# Patient Record
Sex: MALE | Race: WHITE | Employment: FULL TIME | URBAN - METROPOLITAN AREA
[De-identification: names, ages, dates, MRNs, and addresses within clinical notes are randomized per-mention and may not be internally consistent; named-entity substitution may affect disease eponyms.]

---

## 2020-01-15 ENCOUNTER — HOSPITAL ENCOUNTER (EMERGENCY)
Facility: CLINIC | Age: 30
Discharge: HOME | End: 2020-01-15
Attending: EMERGENCY MEDICINE | Admitting: EMERGENCY MEDICINE
Payer: COMMERCIAL

## 2020-01-15 VITALS
DIASTOLIC BLOOD PRESSURE: 71 MMHG | OXYGEN SATURATION: 96 % | SYSTOLIC BLOOD PRESSURE: 131 MMHG | TEMPERATURE: 98.4 F | RESPIRATION RATE: 24 BRPM | HEIGHT: 65 IN | HEART RATE: 92 BPM

## 2020-01-15 DIAGNOSIS — J10.1 INFLUENZA A: ICD-10-CM

## 2020-01-15 PROCEDURE — 99282 EMERGENCY DEPT VISIT SF MDM: CPT

## 2020-01-15 ASSESSMENT — ENCOUNTER SYMPTOMS
FEVER: 1
SHORTNESS OF BREATH: 1

## 2020-01-15 NOTE — ED AVS SNAPSHOT
Emergency Department  [address] [place] [id number]  Phone:  [phone]  Fax:  [phone]                                    Bandar Ordoñez   MRN: [id number]    Department:   Emergency Department   Date of Visit:  1/15/2020           After Visit Summary Signature Page    I have received my discharge instructions, and my questions have been answered. I have discussed any challenges I see with this plan with the nurse or doctor.    ..........................................................................................................................................  Patient/Patient Representative Signature      ..........................................................................................................................................  Patient Representative Print Name and Relationship to Patient    ..................................................               ................................................  Date                                   Time    ..........................................................................................................................................  Reviewed by Signature/Title    ...................................................              ..............................................  Date                                               Time          22EPIC Rev 08/18

## 2020-01-15 NOTE — LETTER
January 15, 2020      To Whom It May Concern:      Bandar Ordoñez was seen in our Emergency Department today, 01/15/20.  I expect his condition to improve over the next 3 days.  He may return to work/school when improved.    Sincerely,        Son Carty, DO

## 2020-01-16 NOTE — ED PROVIDER NOTES
History     Chief Complaint:    Shortness of Breath and Influenza    The history is provided by the patient.      Bandar Ordoñez is a 29 year old male who presents with shortness of breath that began 4 days ago and has not worsened or improved since then. The patient was seen at [facility] urgent care earlier today and tested positive for influenza A. His blood work was reassuring and no pneumonia was shown on the chest x-ray; see results below.  He denies a history of asthma and did not receive a flu shot this year. The patient was given 2 neb treatments at urgent care with no relief, so he was sent to the ED. He reports fevers and body aches.     Imaging and Laboratory Results from [facility] 01/15/2020:    XR  Chest 2 Views, PA & LAT:   1. Increased bronchovascular  markings both lungs, rule out bronchitis.  2. Prominent prashant bilateral; rule out lymphadenopathy; suggest obtaining a chest CT for further assessment.  3. 9 mm nodular opacity overlying the right lower lateral lung; probably was present in 2016; this can be further assessed on the chest CT, as per radiology.     ISTAT Chem 8: Potassium 3.1 L, Ionized calcium 1.14 L  CBC:  L o/w WNL (WBC 4.9, HGB 16.5)    Influenza A/B Antigen: Influenza A positive    Allergies:  No Known Drug Allergies     Medications:    The patient is currently on no regular medications.    Past Medical History:    The patient denies any significant past medical history.    Past Surgical History:    The patient does not have any pertinent past surgical history.    Family History:    No past pertinent family history.    Social History:  Positive for tobacco use - smokeless tobacco user, never smoked.  Negative for alcohol use.  Negative for drug use.  Marital Status:  Unknown.     Review of Systems   Constitutional: Positive for fever.   Respiratory: Positive for shortness of breath.    Musculoskeletal:        Reports body aches   All other systems reviewed and are  "negative.      Physical Exam     Patient Vitals for the past 24 hrs:   BP Temp Temp src Pulse Resp SpO2 Height   01/15/20 2019 -- -- -- -- -- 97 % --   01/15/20 1957 -- -- -- -- -- 97 % --   01/15/20 1842 131/71 98.4  F (36.9  C) Oral 92 24 95 % 1.651 m (5' 5\")     Physical Exam    General: Alert and cooperative with exam. Patient in mild distress. Normal mentation.  Head:  Scalp is NC/AT  Eyes:  No scleral icterus, PERRL  ENT:  The external nose and ears are normal. The oropharynx is normal and without erythema; mucus membranes are moist. Uvula midline, no evidence of deep space infection.  Neck:  Normal range of motion without rigidity.  CV:  Regular rate and rhythm    No pathologic murmur   Resp:  Mild increased work of breathing.  Lungs clear to auscultation  GI:  Abdomen is soft, no distension, no tenderness. No peritoneal signs  MS:  No lower extremity edema   Skin:  Warm and dry, No rash or lesions noted.  Neuro: Oriented x 3. No gross motor deficits.    Emergency Department Course     Emergency Department Course:  Past medical records, nursing notes, and vitals reviewed.    1959: I performed an exam of the patient as documented above.     2031: I rechecked the patient and discussed the results of his workup thus far.     2041: Patient rechecked and updated.    I personally reviewed the results with the Patient and answered all related questions prior to discharge.     Findings and plan explained to the Patient. Patient discharged home with instructions regarding supportive care, medications, and reasons to return. The importance of close follow-up was reviewed.     Impression & Plan     Medical Decision Making:  Bandar Ordoñez is a 29 year old male who presents for evaluation of shortness of breath, fever and myalgias. This is consistent with influenza; confirmed on outside lab testing.   The patient is out of treatment window for influenza and medications ordered as noted above.  They are at risk for " pneumonia but no signs of this are detected on today's visit; chest x-ray obtained earlier today; see results above (findings discussed with patient).  Close followup of primary care physician is indicated and return to the ED for high fevers, increasing productive cough, shortness of breath, or confusion.  There is no signs of serious bacterial infection such as bacteremia, meningitis, UTI/pyelonephritis, strep pharyngitis, etc. patient was noted to be mildly hypokalemic on labs earlier today; discussed dietary replacement.  At time of discharge patient was hemodynamically stable with normal oxygen saturations and showing no significant evidence of respiratory distress.    Diagnosis:    ICD-10-CM    1. Influenza A J10.1      Disposition:  Discharged to home.    Scribe Disclosure:  IAmanda, am serving as a scribe at 7:59 PM on 1/15/2020 to document services personally performed by Son Carty DO based on my observations and the provider's statements to me.     Amanda Olivera  1/15/2020    EMERGENCY DEPARTMENT       Son Carty DO  01/16/20 0056

## 2021-04-28 ENCOUNTER — APPOINTMENT (OUTPATIENT)
Dept: CT IMAGING | Facility: CLINIC | Age: 31
End: 2021-04-28
Attending: EMERGENCY MEDICINE
Payer: COMMERCIAL

## 2021-04-28 ENCOUNTER — HOSPITAL ENCOUNTER (EMERGENCY)
Facility: CLINIC | Age: 31
Discharge: HOME | End: 2021-04-28
Attending: EMERGENCY MEDICINE | Admitting: EMERGENCY MEDICINE
Payer: COMMERCIAL

## 2021-04-28 VITALS
DIASTOLIC BLOOD PRESSURE: 68 MMHG | SYSTOLIC BLOOD PRESSURE: 109 MMHG | TEMPERATURE: 98.5 F | BODY MASS INDEX: 24.16 KG/M2 | OXYGEN SATURATION: 93 % | WEIGHT: 145 LBS | HEART RATE: 98 BPM | HEIGHT: 65 IN | RESPIRATION RATE: 21 BRPM

## 2021-04-28 DIAGNOSIS — F10.920 ALCOHOLIC INTOXICATION WITHOUT COMPLICATION (H): ICD-10-CM

## 2021-04-28 DIAGNOSIS — R10.84 ABDOMINAL PAIN, GENERALIZED: ICD-10-CM

## 2021-04-28 DIAGNOSIS — E87.20 LACTIC ACIDOSIS: ICD-10-CM

## 2021-04-28 DIAGNOSIS — E87.6 HYPOKALEMIA: ICD-10-CM

## 2021-04-28 DIAGNOSIS — R74.01 TRANSAMINITIS: ICD-10-CM

## 2021-04-28 LAB
ALBUMIN SERPL-MCNC: 4.1 G/DL (ref 3.4–5)
ALP SERPL-CCNC: 66 U/L (ref 40–150)
ALT SERPL W P-5'-P-CCNC: 130 U/L (ref 0–70)
ANION GAP SERPL CALCULATED.3IONS-SCNC: 5 MMOL/L (ref 3–14)
AST SERPL W P-5'-P-CCNC: 91 U/L (ref 0–45)
BASOPHILS # BLD AUTO: 0.1 10E9/L (ref 0–0.2)
BASOPHILS NFR BLD AUTO: 1.3 %
BILIRUB SERPL-MCNC: 0.3 MG/DL (ref 0.2–1.3)
BUN SERPL-MCNC: 6 MG/DL (ref 7–30)
CALCIUM SERPL-MCNC: 7.9 MG/DL (ref 8.5–10.1)
CHLORIDE SERPL-SCNC: 107 MMOL/L (ref 94–109)
CO2 SERPL-SCNC: 28 MMOL/L (ref 20–32)
CREAT SERPL-MCNC: 0.9 MG/DL (ref 0.66–1.25)
DIFFERENTIAL METHOD BLD: NORMAL
EOSINOPHIL # BLD AUTO: 0.2 10E9/L (ref 0–0.7)
EOSINOPHIL NFR BLD AUTO: 3.1 %
ERYTHROCYTE [DISTWIDTH] IN BLOOD BY AUTOMATED COUNT: 12.7 % (ref 10–15)
ETHANOL SERPL-MCNC: 0.28 G/DL
GFR SERPL CREATININE-BSD FRML MDRD: >90 ML/MIN/{1.73_M2}
GLUCOSE SERPL-MCNC: 120 MG/DL (ref 70–99)
HCT VFR BLD AUTO: 46 % (ref 40–53)
HGB BLD-MCNC: 15.5 G/DL (ref 13.3–17.7)
IMM GRANULOCYTES # BLD: 0.1 10E9/L (ref 0–0.4)
IMM GRANULOCYTES NFR BLD: 1.3 %
INTERPRETATION ECG - MUSE: NORMAL
LACTATE BLD-SCNC: 1.6 MMOL/L (ref 0.7–2)
LACTATE BLD-SCNC: 2.6 MMOL/L (ref 0.7–2)
LIPASE SERPL-CCNC: 202 U/L (ref 73–393)
LYMPHOCYTES # BLD AUTO: 3.7 10E9/L (ref 0.8–5.3)
LYMPHOCYTES NFR BLD AUTO: 52.8 %
MAGNESIUM SERPL-MCNC: 2.2 MG/DL (ref 1.6–2.3)
MCH RBC QN AUTO: 28.4 PG (ref 26.5–33)
MCHC RBC AUTO-ENTMCNC: 33.7 G/DL (ref 31.5–36.5)
MCV RBC AUTO: 84 FL (ref 78–100)
MONOCYTES # BLD AUTO: 0.5 10E9/L (ref 0–1.3)
MONOCYTES NFR BLD AUTO: 6.4 %
NEUTROPHILS # BLD AUTO: 2.5 10E9/L (ref 1.6–8.3)
NEUTROPHILS NFR BLD AUTO: 35.1 %
NRBC # BLD AUTO: 0 10*3/UL
NRBC BLD AUTO-RTO: 0 /100
PLATELET # BLD AUTO: 226 10E9/L (ref 150–450)
POTASSIUM SERPL-SCNC: 2.9 MMOL/L (ref 3.4–5.3)
PROT SERPL-MCNC: 7.5 G/DL (ref 6.8–8.8)
RBC # BLD AUTO: 5.45 10E12/L (ref 4.4–5.9)
SODIUM SERPL-SCNC: 140 MMOL/L (ref 133–144)
TROPONIN I SERPL-MCNC: <0.015 UG/L (ref 0–0.04)
WBC # BLD AUTO: 7 10E9/L (ref 4–11)

## 2021-04-28 PROCEDURE — 96365 THER/PROPH/DIAG IV INF INIT: CPT

## 2021-04-28 PROCEDURE — 85025 COMPLETE CBC W/AUTO DIFF WBC: CPT | Performed by: EMERGENCY MEDICINE

## 2021-04-28 PROCEDURE — 250N000011 HC RX IP 250 OP 636: Performed by: EMERGENCY MEDICINE

## 2021-04-28 PROCEDURE — 83605 ASSAY OF LACTIC ACID: CPT | Mod: 91 | Performed by: EMERGENCY MEDICINE

## 2021-04-28 PROCEDURE — 74177 CT ABD & PELVIS W/CONTRAST: CPT

## 2021-04-28 PROCEDURE — 250N000009 HC RX 250: Performed by: EMERGENCY MEDICINE

## 2021-04-28 PROCEDURE — 80053 COMPREHEN METABOLIC PANEL: CPT | Performed by: EMERGENCY MEDICINE

## 2021-04-28 PROCEDURE — 83735 ASSAY OF MAGNESIUM: CPT | Performed by: EMERGENCY MEDICINE

## 2021-04-28 PROCEDURE — 84484 ASSAY OF TROPONIN QUANT: CPT | Performed by: EMERGENCY MEDICINE

## 2021-04-28 PROCEDURE — 258N000003 HC RX IP 258 OP 636: Performed by: EMERGENCY MEDICINE

## 2021-04-28 PROCEDURE — 96361 HYDRATE IV INFUSION ADD-ON: CPT

## 2021-04-28 PROCEDURE — 93005 ELECTROCARDIOGRAM TRACING: CPT

## 2021-04-28 PROCEDURE — 96366 THER/PROPH/DIAG IV INF ADDON: CPT

## 2021-04-28 PROCEDURE — 99285 EMERGENCY DEPT VISIT HI MDM: CPT | Mod: 25

## 2021-04-28 PROCEDURE — 83690 ASSAY OF LIPASE: CPT | Performed by: EMERGENCY MEDICINE

## 2021-04-28 PROCEDURE — 250N000013 HC RX MED GY IP 250 OP 250 PS 637: Performed by: EMERGENCY MEDICINE

## 2021-04-28 PROCEDURE — 82077 ASSAY SPEC XCP UR&BREATH IA: CPT | Performed by: EMERGENCY MEDICINE

## 2021-04-28 PROCEDURE — 96375 TX/PRO/DX INJ NEW DRUG ADDON: CPT

## 2021-04-28 RX ORDER — KETOROLAC TROMETHAMINE 15 MG/ML
15 INJECTION, SOLUTION INTRAMUSCULAR; INTRAVENOUS ONCE
Status: COMPLETED | OUTPATIENT
Start: 2021-04-28 | End: 2021-04-28

## 2021-04-28 RX ORDER — POTASSIUM CHLORIDE 1.5 G/1.58G
40 POWDER, FOR SOLUTION ORAL ONCE
Status: COMPLETED | OUTPATIENT
Start: 2021-04-28 | End: 2021-04-28

## 2021-04-28 RX ORDER — ONDANSETRON 2 MG/ML
4 INJECTION INTRAMUSCULAR; INTRAVENOUS ONCE
Status: COMPLETED | OUTPATIENT
Start: 2021-04-28 | End: 2021-04-28

## 2021-04-28 RX ORDER — IOPAMIDOL 755 MG/ML
73 INJECTION, SOLUTION INTRAVASCULAR ONCE
Status: COMPLETED | OUTPATIENT
Start: 2021-04-28 | End: 2021-04-28

## 2021-04-28 RX ORDER — POTASSIUM CHLORIDE 7.45 MG/ML
10 INJECTION INTRAVENOUS ONCE
Status: COMPLETED | OUTPATIENT
Start: 2021-04-28 | End: 2021-04-28

## 2021-04-28 RX ADMIN — IOPAMIDOL 73 ML: 755 INJECTION, SOLUTION INTRAVENOUS at 04:27

## 2021-04-28 RX ADMIN — SODIUM CHLORIDE 61 ML: 9 INJECTION, SOLUTION INTRAVENOUS at 04:27

## 2021-04-28 RX ADMIN — KETOROLAC TROMETHAMINE 15 MG: 15 INJECTION, SOLUTION INTRAMUSCULAR; INTRAVENOUS at 04:45

## 2021-04-28 RX ADMIN — POTASSIUM CHLORIDE 10 MEQ: 7.46 INJECTION, SOLUTION INTRAVENOUS at 05:56

## 2021-04-28 RX ADMIN — POTASSIUM CHLORIDE 40 MEQ: 1.5 POWDER, FOR SOLUTION ORAL at 05:55

## 2021-04-28 RX ADMIN — ONDANSETRON 4 MG: 2 INJECTION INTRAMUSCULAR; INTRAVENOUS at 04:45

## 2021-04-28 RX ADMIN — SODIUM CHLORIDE 1000 ML: 9 INJECTION, SOLUTION INTRAVENOUS at 04:09

## 2021-04-28 RX ADMIN — SODIUM CHLORIDE 1000 ML: 9 INJECTION, SOLUTION INTRAVENOUS at 04:44

## 2021-04-28 ASSESSMENT — ENCOUNTER SYMPTOMS
DIAPHORESIS: 1
SHORTNESS OF BREATH: 0
ABDOMINAL PAIN: 1
PALPITATIONS: 1
FEVER: 0
NAUSEA: 0
VOMITING: 0

## 2021-04-28 ASSESSMENT — MIFFLIN-ST. JEOR: SCORE: 1554.6

## 2021-04-28 NOTE — LETTER
April 28, 2021      To Whom It May Concern:      Bandar BENIGNO Ordoñez was seen in our Emergency Department today, 04/28/21. Please excuse him from work due to medical concern.    Sincerely,        Noel Mondragon MD

## 2021-04-28 NOTE — DISCHARGE INSTRUCTIONS
You need to stop drinking.  We can see evidence of damage to your liver related to your alcohol use.  Increase dietary potassium.  Establish care with a primary care provider to follow your ongoing abdominal pain and ensure you are improving as expected. If your pain persists, you may need to see a gastroenterologist.  Return immediately with worsening symptoms or new concerns of any kind.    *[facility]  (type CD intake in the search field)  [email or web address]  [phone]   inpatient services [phone]  or [phone] To arrange an appointment with CD counselor   [facility]  [email or web address]  [phone] Hours vary, call for information  Rule 25 assessments  Counseling & assessments  For CD & outpatient treatment   [place]  [facility])  http://[facility].org   [phone] [address], [facility]  Residential drug and alcohol programs serving teens and adults from all ethnic, socioeconomic and L[employer] backgrounds       [facility]                                     [phone]                        [facility] site  [email or web address]

## 2021-04-28 NOTE — ED NOTES
"Pt ambulated to bathroom with no assistance. Reports \"feeling like shit\" but otherwise content. Declines breakfast.  "

## 2021-04-28 NOTE — ED PROVIDER NOTES
"  History   Chief Complaint:  Alcohol Intoxication and Abdominal Pain    The history is provided by the patient and the EMS personnel.      Bandar Ordoñez is a 30 year old man with depression and alcohol abuse (typically 4 shots daily for 6 years) who presents for evaluation of alcohol intoxication and abdominal pain. He consumed alcohol tonight, about half of a 1.75L bottle of vodka. He called paramedics because he felt something \"wasn't right\" with palpitations and diaphoresis. He has had generalized abdominal pain for 2 years, worse on the right, which was worse tonight. He expresses concern for \"liver problems\" related to his drinking. He denies suicidal ideation. He has not had fever, cough, shortness of breath, or chest pain. He has not had nausea or vomiting.  He denies any other ingestions or illicit drug use.     Review of Systems   Constitutional: Positive for diaphoresis. Negative for fever.   Respiratory: Negative for shortness of breath.    Cardiovascular: Positive for palpitations. Negative for chest pain.   Gastrointestinal: Positive for abdominal pain. Negative for nausea and vomiting.   Psychiatric/Behavioral: Negative for self-injury and suicidal ideas.   All other systems reviewed and are negative.    Allergies:  No known drug allergies    Medications:    Zoloft    Past Medical History:    Depression  Suicide attempt  Multiple drug overdose  Alcohol abuse  Thrombocytopenia  ADHD    Past Surgical History:    Right undescending testicle repair    Family History:    Adopted    Social History:  Alcohol use: Yes, as per HPI  Drug use: No  Presents to the ED alone via EMS    Physical Exam     Patient Vitals for the past 24 hrs:   BP Temp Temp src Pulse Resp SpO2 Height Weight   04/28/21 0515 -- -- -- 97 13 95 % -- --   04/28/21 0500 (!) 141/101 -- -- 85 15 96 % -- --   04/28/21 0445 -- -- -- 86 -- 97 % -- --   04/28/21 0406 -- -- -- -- -- -- 1.651 m (5' 5\") 65.8 kg (145 lb)   04/28/21 0351 -- 98.5  F " (36.9  C) Oral -- -- -- -- --   04/28/21 0345 (!) 152/93 -- -- 91 22 96 % -- --       Physical Exam  General: Well-developed and well-nourished young  man. Cooperative.  Head:  Atraumatic.  Eyes:  Conjunctivae, lids, and sclerae are normal.  Neck:  Supple. Normal range of motion.  CV:  Regular rate and rhythm. Normal heart sounds with no murmurs, rubs, or gallops detected.  Resp:  No respiratory distress. Clear to auscultation bilaterally without decreased breath sounds, wheezing, rales, or rhonchi.  GI:  Soft. Non-distended.  Mild right abdominal tenderness.     MS:  Normal ROM.   Skin:  Warm. Non-diaphoretic. No pallor.  Neuro: Awake. A&Ox3. Normal strength.  Psych:  Flat affect.  Mildly dysarthric speech.  Not responding to internal stimuli.  No plan of suicide.  Vitals reviewed.    Emergency Department Course   EKG  Indication: Palpitations  Time: 0359  Rate 88 bpm. WI interval 134. QRS duration 82. QT/QTc 344/416.   Sinus rhythm with sinus arrhythmia with occasional premature ventricular complexes. Possible left atrial enlargement. Nonspecific T wave abnormality. Abnormal ECG.   No acute ST changes.  No prior EKG for comparison.    Imaging:    CT Abd/pelvis with contrast:  1.  Apparent mild wall thickening of the ascending colon through the proximal descending colon. The apparent wall thickening is most likely due to nondistention of the colon, but infectious or inflammatory colitis cannot be entirely excluded.   2.  No other cause of acute pain identified in the abdomen and pelvis. Normal appendix.     Imaging independently reviewed and agree with radiologist interpretation.     Laboratory:  CBC: WBC 7.0, HGB 15.5,     CMP: K 2.9 (L),  (H), GUN 6 (L), Ca 7.9 (L),   (H), AST 91 (H), o/w WNL (Creatinine 0.90)    Lipase: 202    Lactic Acid (Resulted 0424): 2.6 (H)  Lactic Acid (Resulted 0602): 1.6    Troponin (Collected 0334): <0.015    Magnesium: 2.2    Alcohol ethyl: 0.28  "(H)    Emergency Department Course:    Reviewed:  I reviewed the patient's nursing notes, vitals, and past available medical records.     Assessments:  0342: I obtained history and examined the patient as noted above.     0500: I rechecked the patient. He is sleeping comfortably.     0715: The patient was signed out to Dr. Mondragon pending sober reevaluation.    Interventions:  0409: NS 1L IV Bolus   0445: Zofran 4 mg IV  0445: Toradol 15 mg IV  0445: NS 1L IV Bolus   0555: Potassium chloride 40 mEq PO  0556: Potassium chloride 10 mEq in 100 mL intermittent infusion    Disposition:  Care of the patient was transferred to my colleague, Dr. Mondragon, pending sober reevaluation.    Impression & Plan    CMS Diagnoses: The Lactic acid level is elevated due to alcohol intoxication and dehydration, at this time there is no sign of severe sepsis or septic shock.     Medical Decision Making:  Bandar is a 30-year-old man who drinks daily and presents because he felt something was \"not right\".  He has difficult time describing his symptoms aside from gradually worsening abdominal pain for 2 years which he is concerned may represent damage to his liver from his alcohol use.  He also had palpitations last night with EKG today revealing no arrhythmias.  There are no ischemic changes and troponin is undetectable.  He appears generally well on exam with flat affect.  He denies suicidal ideation.  He has some mild right abdominal tenderness but it is ill-defined and difficult to reproduce.  Fortunately, CT of the abdomen and pelvis does not reveal acute pathology with mild wall thickening of the ascending colon most likely due to nondistention.  The hepatobiliary system is unremarkable on this exam.  He does have mild elevations in ALT to 130 and AST to 91 which are likely related to his alcohol use with current blood alcohol level elevated at 0.28.  There is no evidence of pancreatitis, biliary obstruction, or kidney injury.  " Hypokalemia to 2.9 was repleted with both oral and IV potassium.  Initial lactic acidosis to 2.6 resolved after 2 L of IV fluids.  This is almost certainly related to dehydration related to his alcohol abuse.  In addition to IV fluids and potassium, Bandar was treated with Toradol and Zofran.  On repeat assessment he is sleeping comfortably.  At the end of my shift he will require further metabolism of this alcohol such that he can appropriately be reassessed to ensure he does not require a mental health evaluation prior to safe discharge. He was endorsed to my partner, Dr. Mondragon, for final disposition.  I have prepared his discharge instructions which include resources for chemical dependency, recommendations to increase his dietary potassium, and advice to establish care with primary care for follow-up on his abdominal pain.    Diagnosis:    ICD-10-CM    1. Alcoholic intoxication without complication (H)  F10.920    2. Transaminitis  R74.01    3. Lactic acidosis  E87.2    4. Hypokalemia  E87.6    5. Abdominal pain, generalized  R10.84        Scribe Disclosure:  I, Nitish You, am serving as a scribe at 3:42 AM on 4/28/2021 to document services personally performed by Angelika Davis MD based on my observations and the provider's statements to me.      Angelika Davis MD  04/29/21 0514

## 2021-04-28 NOTE — ED PROVIDER NOTES
Patient was signed out to me by Dr Davis. Briefly, pt called EMS for abdominal pain and had reassuring evaluation. Transaminitis thought due to alcohol abuse.  Found to be intoxicated. Plan for discharge when sober. On JAGJIT.     9:48 AM: pt reported to have walked with steady gait. He is clinically sober. No signed etoh w/d. Pt denies SI. Abdomen soft and no peritoneal signs. He plans to take taxi home. Will discharge after tolerating PO.      Noel Mondragon MD  04/28/21 0949       Noel Mondragon MD  04/28/21 0950